# Patient Record
Sex: FEMALE | Race: WHITE
[De-identification: names, ages, dates, MRNs, and addresses within clinical notes are randomized per-mention and may not be internally consistent; named-entity substitution may affect disease eponyms.]

---

## 2023-10-24 ENCOUNTER — HOSPITAL ENCOUNTER (EMERGENCY)
Dept: HOSPITAL 47 - EC | Age: 43
Discharge: HOME | End: 2023-10-24
Payer: COMMERCIAL

## 2023-10-24 VITALS — RESPIRATION RATE: 18 BRPM

## 2023-10-24 VITALS — HEART RATE: 75 BPM | DIASTOLIC BLOOD PRESSURE: 53 MMHG | SYSTOLIC BLOOD PRESSURE: 112 MMHG

## 2023-10-24 VITALS — TEMPERATURE: 98.4 F

## 2023-10-24 DIAGNOSIS — Z88.2: ICD-10-CM

## 2023-10-24 DIAGNOSIS — N76.0: Primary | ICD-10-CM

## 2023-10-24 LAB
ALBUMIN SERPL-MCNC: 3.6 G/DL (ref 3.5–5)
ALP SERPL-CCNC: 53 U/L (ref 38–126)
ALT SERPL-CCNC: 17 U/L (ref 4–34)
ANION GAP SERPL CALC-SCNC: 9 MMOL/L
AST SERPL-CCNC: 31 U/L (ref 14–36)
BASOPHILS # BLD AUTO: 0 K/UL (ref 0–0.2)
BASOPHILS NFR BLD AUTO: 1 %
BUN SERPL-SCNC: 12 MG/DL (ref 7–17)
CALCIUM SPEC-MCNC: 8.6 MG/DL (ref 8.4–10.2)
CHLORIDE SERPL-SCNC: 108 MMOL/L (ref 98–107)
CO2 SERPL-SCNC: 23 MMOL/L (ref 22–30)
EOSINOPHIL # BLD AUTO: 0.1 K/UL (ref 0–0.7)
EOSINOPHIL NFR BLD AUTO: 1 %
ERYTHROCYTE [DISTWIDTH] IN BLOOD BY AUTOMATED COUNT: 3.66 M/UL (ref 3.8–5.4)
ERYTHROCYTE [DISTWIDTH] IN BLOOD: 19.3 % (ref 11.5–15.5)
GLUCOSE SERPL-MCNC: 98 MG/DL (ref 74–99)
HCG SERPL-MCNC: <2.4 MIU/ML
HCT VFR BLD AUTO: 29.3 % (ref 34–46)
HGB BLD-MCNC: 9.2 GM/DL (ref 11.4–16)
LYMPHOCYTES # SPEC AUTO: 2.7 K/UL (ref 1–4.8)
LYMPHOCYTES NFR SPEC AUTO: 41 %
MCH RBC QN AUTO: 25.1 PG (ref 25–35)
MCHC RBC AUTO-ENTMCNC: 31.3 G/DL (ref 31–37)
MCV RBC AUTO: 80.1 FL (ref 80–100)
MONOCYTES # BLD AUTO: 0.5 K/UL (ref 0–1)
MONOCYTES NFR BLD AUTO: 7 %
NEUTROPHILS # BLD AUTO: 3.2 K/UL (ref 1.3–7.7)
NEUTROPHILS NFR BLD AUTO: 48 %
PH UR: 6.5 [PH] (ref 5–8)
PLATELET # BLD AUTO: 311 K/UL (ref 150–450)
POTASSIUM SERPL-SCNC: 4.1 MMOL/L (ref 3.5–5.1)
PROT SERPL-MCNC: 6.4 G/DL (ref 6.3–8.2)
RBC UR QL: 1 /HPF (ref 0–5)
SODIUM SERPL-SCNC: 140 MMOL/L (ref 137–145)
SP GR UR: 1.02 (ref 1–1.03)
SQUAMOUS UR QL AUTO: 5 /HPF (ref 0–4)
UROBILINOGEN UR QL STRIP: <2 MG/DL (ref ?–2)
WBC # BLD AUTO: 6.6 K/UL (ref 3.8–10.6)
WBC # UR AUTO: 1 /HPF (ref 0–5)

## 2023-10-24 PROCEDURE — 99285 EMERGENCY DEPT VISIT HI MDM: CPT

## 2023-10-24 PROCEDURE — 86780 TREPONEMA PALLIDUM: CPT

## 2023-10-24 PROCEDURE — 84702 CHORIONIC GONADOTROPIN TEST: CPT

## 2023-10-24 PROCEDURE — 96375 TX/PRO/DX INJ NEW DRUG ADDON: CPT

## 2023-10-24 PROCEDURE — 85025 COMPLETE CBC W/AUTO DIFF WBC: CPT

## 2023-10-24 PROCEDURE — 36415 COLL VENOUS BLD VENIPUNCTURE: CPT

## 2023-10-24 PROCEDURE — 96365 THER/PROPH/DIAG IV INF INIT: CPT

## 2023-10-24 PROCEDURE — 87591 N.GONORRHOEAE DNA AMP PROB: CPT

## 2023-10-24 PROCEDURE — 76856 US EXAM PELVIC COMPLETE: CPT

## 2023-10-24 PROCEDURE — 81001 URINALYSIS AUTO W/SCOPE: CPT

## 2023-10-24 PROCEDURE — 76830 TRANSVAGINAL US NON-OB: CPT

## 2023-10-24 PROCEDURE — 80053 COMPREHEN METABOLIC PANEL: CPT

## 2023-10-24 PROCEDURE — 86140 C-REACTIVE PROTEIN: CPT

## 2023-10-24 PROCEDURE — 87491 CHLMYD TRACH DNA AMP PROBE: CPT

## 2023-10-24 NOTE — US
EXAMINATION TYPE: US pelvis complete transvag

 

DATE OF EXAM: 10/24/2023

 

COMPARISON: NONE

 

CLINICAL INDICATION: Female, 43 years old with history of Pelvic pain and discharge; Pt states miscar
riage 2 weeks ago. Pt had heavy bleeding and large clots. Pt did not have an US to confirm miscarriag
e. Pt is now having abnormal discharge, but no bleeding for over a week.

 

TECHNIQUE: Transabdominal sonographic images of the pelvis were acquired.  Transvaginal sonographic i
mages were medically necessary to better assess the following anatomy: Endometrium

 

Date of LMP:  9/21/23

 

EXAM MEASUREMENTS:

 

Uterus:  8.7 x 5.9 x 5.2 cm

Endometrial Stripe: 1.8 cm

Right Ovary:  3.3 x 3.2 x 2.4 cm

Left Ovary:  2.7 x 2.0 x 2.2 cm

 

 

 

1. Uterus:  Anteverted   2 hypoechoic areas seen in cervix

2. Endometrium:  heterogeneous and thickened. There does not appear to be any hypervascularity

3. Right Ovary:  Cystic area seen measuring 2.1 x 2.4 x 1.6cm

4. Left Ovary:  Cystic area seen measuring 1.9 x 1.8 x 1.3cm

5. Bilateral Adnexa:  The vessels appear to be dilated in bilateral adnexas

6. Posterior cul-de-sac:  Free fluid seen

 

Endometrium measures up to 10 mm without any hypervascularity. Bilateral small ovarian cysts. Free fl
uid seen in cul de sac.

 

IMPRESSION: 

No evidence for retained packs of conception.

## 2023-10-24 NOTE — ED
Female Urogenital HPI





- General


Chief complaint: Vaginal Bleeding


Stated complaint: abnormal vaginal discharge


Time Seen by Provider: 10/24/23 16:10


Source: patient, RN notes reviewed


Mode of arrival: ambulatory


Limitations: no limitations





- History of Present Illness


Initial comments: 


This is a 43-year-old female who presents to the emergency department for pelvic

pain and vaginal discharge.  States that about 2 weeks ago she had a miscarriage

at 8 weeks.  Around 5 days ago, she started to develop heavy vaginal discharge, 

pelvic pressure, and nausea.  States that the discharge is white with a foul 

fishy odor. Denies any concern for STD exposure. She is at [facility] for 

N[facility]rous use and they gave her a dose of fluconazole, with no improvement in 

symptoms.  She was told that if something like this occurred, she should be 

evaluated to rule out a bacterial infection.  She denies any vaginal bleeding 

with this. Per the information sent with the patient from [facility], they 

would like her evaluated for retained tissue and STDs. 





MD Complaint: vaginal discharge


Onset/Timin


-: days(s)





- Related Data


                                  Previous Rx's











 Medication  Instructions  Recorded


 


metroNIDAZOLE [Flagyl] 500 mg PO BID 7 Days #14 tab 10/24/23











                                    Allergies











Allergy/AdvReac Type Severity Reaction Status Date / Time


 


NSAIDS (Non-Steroidal Allergy  Unknown Verified 10/24/23 15:02





Anti-Inflamma     














Review of Systems


ROS Statement: 


Those systems with pertinent positive or pertinent negative responses have been 

documented in the HPI.





ROS Other: All systems not noted in ROS Statement are negative.





Past Medical History


Past Medical History: No Reported History


Additional Past Surgical History / Comment(s): gastric bypass


Past Psychological History: No Psychological Hx Reported


Smoking Status: Never smoker


Past Alcohol Use History: None Reported


Past Drug Use History: Unable to Obtain





General Exam


Limitations: no limitations


General appearance: alert, in no apparent distress


Head exam: Present: atraumatic, normocephalic, normal inspection


Respiratory exam: Present: normal lung sounds bilaterally.  Absent: respiratory 

distress, wheezes, rales, rhonchi, stridor


Cardiovascular Exam: Present: regular rate, normal rhythm, normal heart sounds. 

 Absent: systolic murmur, diastolic murmur, rubs, gallop, clicks


GI/Abdominal exam: Present: soft, tenderness (Suprapubic), normal bowel sounds. 

 Absent: distended


Neurological exam: Present: alert, oriented X3, CN II-XII intact


Psychiatric exam: Present: normal affect, normal mood


Skin exam: Present: warm, dry, intact, normal color.  Absent: rash





Course


                                   Vital Signs











  10/24/23 10/24/23





  14:58 17:41


 


Temperature 98.4 F 


 


Pulse Rate 88 56 L


 


Respiratory 16 18





Rate  


 


Blood Pressure 99/64 90/52


 


O2 Sat by Pulse 100 97





Oximetry  














Medical Decision Making





- Medical Decision Making


This is a 43-year-old female who presents to the emergency department for 

vaginal discharge.





Was pt. sent in by a medical professional or institution?


@  -[facility]


Did you speak to anyone other than the patient for history?  


@  -No


Did you review nursing and triage notes? 


@  -Yes, and I agree, it is accurate with regards to the patient's symptoms.


Were old charts reviewed? 


@  -No


Differential Diagnosis? 


@  -Differential Vaginal Discharge:


STD, vulvovaginal candidiasis, bacterial vaginosis, this is not meant to be an 

all-inclusive list.


EKG interpreted by me (3pts min.)?


@  -Not obtained


X-rays interpreted by me (1pt min.)?


@  -Not obtained


CT interpreted by me (1pt min.)?


@  -Not obtained


U/S interpreted by me (1pt. min.)?


@  -Not interpreted by me


What testing was considered but not performed? (CT, X-rays, U/S, labs)? Why?


@  -None


What meds were considered but not given? Why?


@  -None


Did you discuss the management of the patient with other professionals?


@  -No


Did you reconcile home meds?


@  -No


Was smoking cessation discussed for >3mins.?


@  -No


Was critical care preformed (if so, how long)?


@  -No


Were there social determinants of health that impacted care today? How? 

(Homelessness, low income, unemployed, alcoholism, drug addiction, 

transportation, low edu. Level, literacy, decrease access to med. care, alf, 

rehab)?


@  -No


Was there de-escalation of care discussed even if they declined? (Discuss DNR or

 withdrawal of care, Hospice)?


@  -No


What co-morbidities impacted this encounter? (DM, HTN, Smoking, COPD, CAD, C

ancer, CVA, Hep., AIDS, mental health diagnosis, sleep apnea, morbid obesity)?


@  -None


Was patient admitted / discharged?


@  -Discharged. Lab work obtained revealing a hemoglobin of 9.2.  We've no prior

 values for the comparison.  Patient denies any vaginal bleeding at this time.  

Lab work was otherwise unremarkable.  Urinalysis negative for signs of 

infection.  Patient treated with IV fluids, Tylenol, and Zofran, with 

improvement in symptoms.  Transvaginal ultrasound obtained revealing no acute 

process, including no evidence of retained products of contraception.  The 

description of the patient's symptoms of vaginal discharge with foul fishy odor 

is more so suggestive of bacterial vaginosis.  Patient will be started on 

metronidazole.  She was given an initial dose in the emergency department and a 

prescription for a 7 day course of metronidazole was provided.  Otherwise 

advised Tylenol as needed for pain relief.  Patient discharged back to [facility] in stable condition.


Undiagnosed new problem with uncertain prognosis?


@  -None


Drug Therapy requiring intensive monitoring for toxicity (Heparin, Nitro, 

Insulin, Cardizem)?


@  -None


Were any procedures done?


@  -None


Diagnosis/symptom?


@  -Bacterial vaginosis


Acute, or Chronic, or Acute on Chronic?


@  -Acute


Uncomplicated (without systemic symptoms) or Complicated (systemic symptoms)?


@  -Uncomplicated


Side effects of treatment?


@  -None


Exacerbation, Progression, or Severe Exacerbation]


@  -Not applicable


Poses a threat to life or bodily function?


@  -No





Return precautions reviewed in depth, the patient is instructed to return to the

emergency department with any new, worsening, or concerning symptoms. Patient v

erbalized understanding. 





This case was discussed in detail with the attending ED physician, Dr. Romero. 

Presentation, findings, and treatment plan discussed in detail as well. 











- Lab Data


Result diagrams: 


                                 10/24/23 16:28





                                 10/24/23 16:28


                                   Lab Results











  10/24/23 10/24/23 10/24/23 Range/Units





  16:28 16:28 16:28 


 


WBC  6.6    (3.8-10.6)  k/uL


 


RBC  3.66 L    (3.80-5.40)  m/uL


 


Hgb  9.2 L    (11.4-16.0)  gm/dL


 


Hct  29.3 L    (34.0-46.0)  %


 


MCV  80.1    (80.0-100.0)  fL


 


MCH  25.1    (25.0-35.0)  pg


 


MCHC  31.3    (31.0-37.0)  g/dL


 


RDW  19.3 H    (11.5-15.5)  %


 


Plt Count  311    (150-450)  k/uL


 


MPV  7.6    


 


Neutrophils %  48    %


 


Lymphocytes %  41    %


 


Monocytes %  7    %


 


Eosinophils %  1    %


 


Basophils %  1    %


 


Neutrophils #  3.2    (1.3-7.7)  k/uL


 


Lymphocytes #  2.7    (1.0-4.8)  k/uL


 


Monocytes #  0.5    (0-1.0)  k/uL


 


Eosinophils #  0.1    (0-0.7)  k/uL


 


Basophils #  0.0    (0-0.2)  k/uL


 


Hypochromasia  Marked    


 


Anisocytosis  Slight    


 


Microcytosis  Slight    


 


Sodium    140  (137-145)  mmol/L


 


Potassium    4.1  (3.5-5.1)  mmol/L


 


Chloride    108 H  ()  mmol/L


 


Carbon Dioxide    23  (22-30)  mmol/L


 


Anion Gap    9  mmol/L


 


BUN    12  (7-17)  mg/dL


 


Creatinine    0.52  (0.52-1.04)  mg/dL


 


Est GFR (CKD-EPI)AfAm    >90  (>60 ml/min/1.73 sqM)  


 


Est GFR (CKD-EPI)NonAf    >90  (>60 ml/min/1.73 sqM)  


 


Glucose    98  (74-99)  mg/dL


 


Calcium    8.6  (8.4-10.2)  mg/dL


 


Total Bilirubin    0.8  (0.2-1.3)  mg/dL


 


AST    31  (14-36)  U/L


 


ALT    17  (4-34)  U/L


 


Alkaline Phosphatase    53  ()  U/L


 


C-Reactive Protein    <0.5  (<1.0)  mg/dL


 


Total Protein    6.4  (6.3-8.2)  g/dL


 


Albumin    3.6  (3.5-5.0)  g/dL


 


HCG, Quant    <2.4  mIU/mL


 


Urine Color   Yellow   


 


Urine Appearance   Slightly Cloudy H   (Clear)  


 


Urine pH   6.5   (5.0-8.0)  


 


Ur Specific Gravity   1.020   (1.001-1.035)  


 


Urine Protein   Negative   (Negative)  


 


Urine Glucose (UA)   Negative   (Negative)  


 


Urine Ketones   Negative   (Negative)  


 


Urine Blood   Negative   (Negative)  


 


Urine Nitrite   Negative   (Negative)  


 


Urine Bilirubin   Negative   (Negative)  


 


Urine Urobilinogen   <2.0   (<2.0)  mg/dL


 


Ur Leukocyte Esterase   Small   (Negative)  


 


Urine RBC   1   (0-5)  /hpf


 


Urine WBC   1   (0-5)  /hpf


 


Ur Squamous Epith Cells   5 H   (0-4)  /hpf


 


Urine Bacteria   Rare H   (None)  /hpf


 


Urine Mucus   Few H   (None)  /hpf














- Radiology Data


Radiology results: report reviewed, image reviewed





Disposition


Clinical Impression: 


 Bacterial vaginosis





Disposition: HOME SELF-CARE


Instructions (If sedation given, give patient instructions):  Bacterial 

Vaginosis (ED)


Additional Instructions: 


Return to the emergency department with any new, worsening, or concerning 

symptoms.  Take the metronidazole as prescribed for 7 days.  You can take 

Tylenol as needed for pain relief.  Follow up with your primary care provider in

1-2 days.


Prescriptions: 


metroNIDAZOLE [Flagyl] 500 mg PO BID 7 Days #14 tab


Is patient prescribed a controlled substance at d/c from ED?: No


Referrals: 


None,Stated [Primary Care Provider] - 1-2 days